# Patient Record
Sex: FEMALE | Race: WHITE | NOT HISPANIC OR LATINO | Employment: OTHER | URBAN - METROPOLITAN AREA
[De-identification: names, ages, dates, MRNs, and addresses within clinical notes are randomized per-mention and may not be internally consistent; named-entity substitution may affect disease eponyms.]

---

## 2017-05-04 ENCOUNTER — HOSPITAL ENCOUNTER (OUTPATIENT)
Dept: FAMILY MEDICINE CLINIC | Facility: CLINIC | Age: 37
Setting detail: SPECIMEN
Discharge: HOME | End: 2017-05-04
Attending: NURSE PRACTITIONER | Admitting: NURSE PRACTITIONER

## 2017-05-04 ENCOUNTER — CONVERSION ENCOUNTER (OUTPATIENT)
Dept: FAMILY MEDICINE CLINIC | Facility: CLINIC | Age: 37
End: 2017-05-04

## 2017-05-04 LAB
ALBUMIN SERPL-MCNC: 3.6 G/DL (ref 3.5–4.8)
ALBUMIN/GLOB SERPL: 1.2 {RATIO} (ref 1–1.7)
ALP SERPL-CCNC: 52 IU/L (ref 32–91)
ALT SERPL-CCNC: 12 IU/L (ref 14–54)
ANION GAP SERPL CALC-SCNC: 12.1 MMOL/L (ref 10–20)
AST SERPL-CCNC: 15 IU/L (ref 15–41)
BASOPHILS # BLD AUTO: 0 10*3/UL (ref 0–0.2)
BASOPHILS NFR BLD AUTO: 1 % (ref 0–2)
BILIRUB SERPL-MCNC: 0.4 MG/DL (ref 0.3–1.2)
BUN SERPL-MCNC: 13 MG/DL (ref 8–20)
BUN/CREAT SERPL: 16.3 (ref 5.4–26.2)
CALCIUM SERPL-MCNC: 8.9 MG/DL (ref 8.9–10.3)
CHLORIDE SERPL-SCNC: 105 MMOL/L (ref 101–111)
CHOLEST SERPL-MCNC: 145 MG/DL
CHOLEST/HDLC SERPL: 3.1 {RATIO}
CONV CO2: 30 MMOL/L (ref 22–32)
CONV LDL CHOLESTEROL DIRECT: 72 MG/DL (ref 0–100)
CONV TOTAL PROTEIN: 6.6 G/DL (ref 6.1–7.9)
CREAT UR-MCNC: 0.8 MG/DL (ref 0.4–1)
DIFFERENTIAL METHOD BLD: (no result)
EOSINOPHIL # BLD AUTO: 0.2 10*3/UL (ref 0–0.3)
EOSINOPHIL # BLD AUTO: 3 % (ref 0–3)
ERYTHROCYTE [DISTWIDTH] IN BLOOD BY AUTOMATED COUNT: 19.3 % (ref 11.5–14.5)
GLOBULIN UR ELPH-MCNC: 3 G/DL (ref 2.5–3.8)
GLUCOSE SERPL-MCNC: 110 MG/DL (ref 65–99)
HCT VFR BLD AUTO: 34 % (ref 35–49)
HDLC SERPL-MCNC: 47 MG/DL
HGB BLD-MCNC: 10.6 G/DL (ref 12–15)
LDLC/HDLC SERPL: 1.5 {RATIO}
LIPID INTERPRETATION: ABNORMAL
LYMPHOCYTES # BLD AUTO: 1.8 10*3/UL (ref 0.8–4.8)
LYMPHOCYTES NFR BLD AUTO: 30 % (ref 18–42)
MCH RBC QN AUTO: 21.8 PG (ref 26–32)
MCHC RBC AUTO-ENTMCNC: 31.1 G/DL (ref 32–36)
MCV RBC AUTO: 70.1 FL (ref 80–94)
MONOCYTES # BLD AUTO: 0.3 10*3/UL (ref 0.1–1.3)
MONOCYTES NFR BLD AUTO: 6 % (ref 2–11)
NEUTROPHILS # BLD AUTO: 3.6 10*3/UL (ref 2.3–8.6)
NEUTROPHILS NFR BLD AUTO: 60 % (ref 50–75)
NRBC BLD AUTO-RTO: 0 /100{WBCS}
NRBC/RBC NFR BLD MANUAL: 0 10*3/UL
PLATELET # BLD AUTO: 181 10*3/UL (ref 150–450)
PMV BLD AUTO: 10.5 FL (ref 7.4–10.4)
POTASSIUM SERPL-SCNC: 4.1 MMOL/L (ref 3.6–5.1)
RBC # BLD AUTO: 4.85 10*6/UL (ref 4–5.4)
SODIUM SERPL-SCNC: 143 MMOL/L (ref 136–144)
TRIGL SERPL-MCNC: 191 MG/DL
TSH SERPL-ACNC: 2.79 UIU/ML (ref 0.34–5.6)
VLDLC SERPL CALC-MCNC: 25.6 MG/DL
WBC # BLD AUTO: 6 10*3/UL (ref 4.5–11.5)

## 2018-02-20 ENCOUNTER — CONVERSION ENCOUNTER (OUTPATIENT)
Dept: FAMILY MEDICINE CLINIC | Facility: CLINIC | Age: 38
End: 2018-02-20

## 2018-02-20 ENCOUNTER — HOSPITAL ENCOUNTER (OUTPATIENT)
Dept: FAMILY MEDICINE CLINIC | Facility: CLINIC | Age: 38
Setting detail: SPECIMEN
Discharge: HOME | End: 2018-02-20
Attending: NURSE PRACTITIONER | Admitting: NURSE PRACTITIONER

## 2018-02-20 LAB
ALBUMIN SERPL-MCNC: 3.7 G/DL (ref 3.5–4.8)
ALBUMIN/GLOB SERPL: 1 {RATIO} (ref 1–1.7)
ALP SERPL-CCNC: 55 IU/L (ref 32–91)
ALT SERPL-CCNC: 12 IU/L (ref 14–54)
ANION GAP SERPL CALC-SCNC: 11.2 MMOL/L (ref 10–20)
APTT BLD: 25.6 SEC (ref 61–76.5)
AST SERPL-CCNC: 18 IU/L (ref 15–41)
BASOPHILS # BLD AUTO: 0.1 10*3/UL (ref 0–0.2)
BASOPHILS NFR BLD AUTO: 1 % (ref 0–2)
BILIRUB SERPL-MCNC: 0.4 MG/DL (ref 0.3–1.2)
BUN SERPL-MCNC: 5 MG/DL (ref 8–20)
BUN/CREAT SERPL: 5.6 (ref 5.4–26.2)
CALCIUM SERPL-MCNC: 8.7 MG/DL (ref 8.9–10.3)
CHLORIDE SERPL-SCNC: 101 MMOL/L (ref 101–111)
CONV CO2: 29 MMOL/L (ref 22–32)
CONV TOTAL PROTEIN: 7.3 G/DL (ref 6.1–7.9)
CREAT UR-MCNC: 0.9 MG/DL (ref 0.4–1)
DIFFERENTIAL METHOD BLD: (no result)
EOSINOPHIL # BLD AUTO: 0.3 10*3/UL (ref 0–0.3)
EOSINOPHIL # BLD AUTO: 4 % (ref 0–3)
ERYTHROCYTE [DISTWIDTH] IN BLOOD BY AUTOMATED COUNT: 19.3 % (ref 11.5–14.5)
GLOBULIN UR ELPH-MCNC: 3.6 G/DL (ref 2.5–3.8)
GLUCOSE SERPL-MCNC: 92 MG/DL (ref 65–99)
HCT VFR BLD AUTO: 31.8 % (ref 35–49)
HGB BLD-MCNC: 9.6 G/DL (ref 12–15)
INR PPP: 1 (ref 2–3)
IRON SATN MFR SERPL: 4 % (ref 15–50)
IRON SERPL-MCNC: 18 UG/DL (ref 28–170)
LYMPHOCYTES # BLD AUTO: 2 10*3/UL (ref 0.8–4.8)
LYMPHOCYTES NFR BLD AUTO: 30 % (ref 18–42)
MCH RBC QN AUTO: 20.2 PG (ref 26–32)
MCHC RBC AUTO-ENTMCNC: 30.1 G/DL (ref 32–36)
MCV RBC AUTO: 66.9 FL (ref 80–94)
MONOCYTES # BLD AUTO: 0.3 10*3/UL (ref 0.1–1.3)
MONOCYTES NFR BLD AUTO: 5 % (ref 2–11)
NEUTROPHILS # BLD AUTO: 4.1 10*3/UL (ref 2.3–8.6)
NEUTROPHILS NFR BLD AUTO: 60 % (ref 50–75)
NRBC BLD AUTO-RTO: 0 /100{WBCS}
NRBC/RBC NFR BLD MANUAL: 0 10*3/UL
PLATELET # BLD AUTO: 209 10*3/UL (ref 150–450)
PMV BLD AUTO: 9.5 FL (ref 7.4–10.4)
POTASSIUM SERPL-SCNC: 4.2 MMOL/L (ref 3.6–5.1)
PROTHROMBIN TIME: 10.4 SEC (ref 19.4–28.5)
RBC # BLD AUTO: 4.76 10*6/UL (ref 4–5.4)
SODIUM SERPL-SCNC: 137 MMOL/L (ref 136–144)
TIBC SERPL-MCNC: 479 UG/DL (ref 228–428)
WBC # BLD AUTO: 6.9 10*3/UL (ref 4.5–11.5)

## 2018-02-21 LAB
HAV IGM SERPL QL IA: NONREACTIVE
HBV CORE IGM SERPL QL IA: NONREACTIVE
HBV SURFACE AG SERPL QL IA: NONREACTIVE
HCV AB SER DONR QL: NORMAL
HCV AB SER DONR QL: NORMAL

## 2019-06-04 VITALS
RESPIRATION RATE: 16 BRPM | HEART RATE: 80 BPM | RESPIRATION RATE: 20 BRPM | WEIGHT: 284 LBS | HEIGHT: 72 IN | WEIGHT: 293 LBS | HEART RATE: 88 BPM | SYSTOLIC BLOOD PRESSURE: 120 MMHG | DIASTOLIC BLOOD PRESSURE: 72 MMHG | DIASTOLIC BLOOD PRESSURE: 79 MMHG | OXYGEN SATURATION: 94 % | SYSTOLIC BLOOD PRESSURE: 133 MMHG | BODY MASS INDEX: 38.47 KG/M2

## 2019-08-12 RX ORDER — DULOXETIN HYDROCHLORIDE 60 MG/1
CAPSULE, DELAYED RELEASE ORAL
Qty: 60 CAPSULE | Refills: 3 | Status: SHIPPED | OUTPATIENT
Start: 2019-08-12

## 2023-04-01 ENCOUNTER — HOSPITAL ENCOUNTER (EMERGENCY)
Facility: HOSPITAL | Age: 43
Discharge: HOME | End: 2023-04-01
Attending: EMERGENCY MEDICINE | Admitting: EMERGENCY MEDICINE
Payer: MEDICARE

## 2023-04-01 VITALS
RESPIRATION RATE: 16 BRPM | SYSTOLIC BLOOD PRESSURE: 130 MMHG | TEMPERATURE: 98.4 F | HEIGHT: 71 IN | WEIGHT: 293 LBS | DIASTOLIC BLOOD PRESSURE: 82 MMHG | HEART RATE: 79 BPM | OXYGEN SATURATION: 100 % | BODY MASS INDEX: 41.02 KG/M2

## 2023-04-01 DIAGNOSIS — N93.9 VAGINAL BLEEDING: Primary | ICD-10-CM

## 2023-04-01 DIAGNOSIS — R53.83 OTHER FATIGUE: ICD-10-CM

## 2023-04-01 LAB
ALBUMIN SERPL-MCNC: 4.2 G/DL (ref 3.5–5.2)
ALBUMIN/GLOB SERPL: 1.6 G/DL
ALP SERPL-CCNC: 59 U/L (ref 39–117)
ALT SERPL W P-5'-P-CCNC: 12 U/L (ref 1–33)
ANION GAP SERPL CALCULATED.3IONS-SCNC: 9 MMOL/L (ref 5–15)
AST SERPL-CCNC: 19 U/L (ref 1–32)
B-HCG UR QL: NEGATIVE
BASOPHILS # BLD AUTO: 0 10*3/MM3 (ref 0–0.2)
BASOPHILS NFR BLD AUTO: 0.8 % (ref 0–1.5)
BILIRUB SERPL-MCNC: <0.2 MG/DL (ref 0–1.2)
BUN SERPL-MCNC: 16 MG/DL (ref 6–20)
BUN/CREAT SERPL: 17.2 (ref 7–25)
CALCIUM SPEC-SCNC: 9.6 MG/DL (ref 8.6–10.5)
CHLORIDE SERPL-SCNC: 105 MMOL/L (ref 98–107)
CO2 SERPL-SCNC: 28 MMOL/L (ref 22–29)
CREAT SERPL-MCNC: 0.93 MG/DL (ref 0.57–1)
DEPRECATED RDW RBC AUTO: 72.6 FL (ref 37–54)
EGFRCR SERPLBLD CKD-EPI 2021: 78.4 ML/MIN/1.73
EOSINOPHIL # BLD AUTO: 0.3 10*3/MM3 (ref 0–0.4)
EOSINOPHIL NFR BLD AUTO: 4.6 % (ref 0.3–6.2)
ERYTHROCYTE [DISTWIDTH] IN BLOOD BY AUTOMATED COUNT: 25.7 % (ref 12.3–15.4)
GLOBULIN UR ELPH-MCNC: 2.6 GM/DL
GLUCOSE SERPL-MCNC: 118 MG/DL (ref 65–99)
HCT VFR BLD AUTO: 37.1 % (ref 34–46.6)
HGB BLD-MCNC: 11.9 G/DL (ref 12–15.9)
HOLD SPECIMEN: NORMAL
LYMPHOCYTES # BLD AUTO: 1.2 10*3/MM3 (ref 0.7–3.1)
LYMPHOCYTES NFR BLD AUTO: 20.9 % (ref 19.6–45.3)
MCH RBC QN AUTO: 26.1 PG (ref 26.6–33)
MCHC RBC AUTO-ENTMCNC: 32.1 G/DL (ref 31.5–35.7)
MCV RBC AUTO: 81.2 FL (ref 79–97)
MONOCYTES # BLD AUTO: 0.3 10*3/MM3 (ref 0.1–0.9)
MONOCYTES NFR BLD AUTO: 4.2 % (ref 5–12)
NEUTROPHILS NFR BLD AUTO: 4.1 10*3/MM3 (ref 1.7–7)
NEUTROPHILS NFR BLD AUTO: 69.5 % (ref 42.7–76)
NRBC BLD AUTO-RTO: 0.1 /100 WBC (ref 0–0.2)
PLATELET # BLD AUTO: 183 10*3/MM3 (ref 140–450)
PMV BLD AUTO: 8.9 FL (ref 6–12)
POTASSIUM SERPL-SCNC: 4.2 MMOL/L (ref 3.5–5.2)
PROT SERPL-MCNC: 6.8 G/DL (ref 6–8.5)
RBC # BLD AUTO: 4.57 10*6/MM3 (ref 3.77–5.28)
SODIUM SERPL-SCNC: 142 MMOL/L (ref 136–145)
WBC NRBC COR # BLD: 5.9 10*3/MM3 (ref 3.4–10.8)

## 2023-04-01 PROCEDURE — 80053 COMPREHEN METABOLIC PANEL: CPT

## 2023-04-01 PROCEDURE — 85025 COMPLETE CBC W/AUTO DIFF WBC: CPT

## 2023-04-01 PROCEDURE — 99284 EMERGENCY DEPT VISIT MOD MDM: CPT

## 2023-04-01 PROCEDURE — 81025 URINE PREGNANCY TEST: CPT

## 2023-04-01 RX ORDER — SODIUM CHLORIDE 0.9 % (FLUSH) 0.9 %
10 SYRINGE (ML) INJECTION AS NEEDED
Status: DISCONTINUED | OUTPATIENT
Start: 2023-04-01 | End: 2023-04-01 | Stop reason: HOSPADM

## 2023-04-01 NOTE — DISCHARGE INSTRUCTIONS
Follow up with GYN Monday     Follow up with PCP as needed     Return to the ED for new or worsening symptoms

## 2023-04-01 NOTE — ED PROVIDER NOTES
Subjective   History of Present Illness  Chief Complaint: Fatigue, concerns for anemia      HPI: Patient is a 42-year-old female presents to the ER today with increasing vaginal bleeding and fatigue she has been having persistent vaginal bleeding for over 1 year states she has not seen her gynecologist in over a year she had been taking Provera 40 mg daily but states that she has been out of it for 3 weeks and has had increasing bleeding.  She states she has been told in the past that she needed an ablation but left the hospital and has not followed up since.  She has had no chest pain or shortness of breath.    She has been sexually active without protection in the last 2 months, is not currently on birth control    PCP: [facility]        Review of Systems   Constitutional: Positive for fatigue.   Respiratory: Negative for shortness of breath.    Cardiovascular: Negative for chest pain.   Gastrointestinal: Negative for abdominal pain, nausea and vomiting.   Genitourinary: Positive for vaginal bleeding. Negative for dysuria.   Neurological: Negative for dizziness and headaches.       No past medical history on file.    Allergies   Allergen Reactions   • Ibuprofen Swelling   • Latex Rash       No past surgical history on file.    No family history on file.    Social History     Socioeconomic History   • Marital status:            Objective   Physical Exam  Vitals reviewed.   Constitutional:       General: She is not in acute distress.     Appearance: She is obese. She is not ill-appearing.   HENT:      Head: Normocephalic.   Eyes:      Extraocular Movements: Extraocular movements intact.      Pupils: Pupils are equal, round, and reactive to light.   Cardiovascular:      Rate and Rhythm: Normal rate.      Pulses: Normal pulses.      Heart sounds: Normal heart sounds.   Pulmonary:      Effort: Pulmonary effort is normal.      Breath sounds: Normal breath sounds. No rales.   Abdominal:      General: Bowel sounds are  "normal.      Palpations: Abdomen is soft.      Tenderness: There is no abdominal tenderness.   Musculoskeletal:         General: Normal range of motion.      Cervical back: Normal range of motion and neck supple.   Skin:     General: Skin is warm and dry.      Capillary Refill: Capillary refill takes less than 2 seconds.      Coloration: Skin is not pale.   Neurological:      General: No focal deficit present.      Mental Status: She is alert and oriented to person, place, and time. Mental status is at baseline.      Motor: No weakness.   Psychiatric:         Mood and Affect: Mood normal.         Behavior: Behavior normal.         Thought Content: Thought content normal.         Judgment: Judgment normal.         Procedures           ED Course  ED Course as of 04/01/23 1846   Sat Apr 01, 2023   1741 Hemoglobin(!): 11.9 []   1826 Awaiting return call from GYN   []   1837 Spoke with Dr. Okeefe states as patient will not consent to pelvic exam or repeat ultrasound, do not refill medroxyprogesterone.   Advise her to follow up with GYN. []      ED Course User Index  [] Shani Greenwood, APRN      /82   Pulse 79   Temp 98.4 °F (36.9 °C)   Resp 16   Ht 180.3 cm (71\")   Wt (!) 147 kg (323 lb 3.1 oz)   LMP 03/11/2023   SpO2 100%   BMI 45.08 kg/m²   Labs Reviewed   COMPREHENSIVE METABOLIC PANEL - Abnormal; Notable for the following components:       Result Value    Glucose 118 (*)     All other components within normal limits    Narrative:     GFR Normal >60  Chronic Kidney Disease <60  Kidney Failure <15     CBC WITH AUTO DIFFERENTIAL - Abnormal; Notable for the following components:    Hemoglobin 11.9 (*)     MCH 26.1 (*)     RDW 25.7 (*)     RDW-SD 72.6 (*)     Monocyte % 4.2 (*)     All other components within normal limits   PREGNANCY, URINE - Normal   CBC AND DIFFERENTIAL    Narrative:     The following orders were created for panel order CBC & Differential.  Procedure                            "    Abnormality         Status                     ---------                               -----------         ------                     CBC Auto Differential[[id number]]        Abnormal            Final result               Scan Slide[[id number]]                                                                    Please view results for these tests on the individual orders.   EXTRA TUBES    Narrative:     The following orders were created for panel order Extra Tubes.  Procedure                               Abnormality         Status                     ---------                               -----------         ------                     Green Top (Gel)[[id number]]                                  Final result                 Please view results for these tests on the individual orders.   GREEN TOP     Medications   sodium chloride 0.9 % flush 10 mL (has no administration in time range)     No radiology results for the last day                                       Medical Decision Making  As patient presented to the emergency room with increased fatigue concerns for anemia, she has a chronic history of vaginal bleeding she has not seen her gynecologist in over a year, stated that she takes methyl progesterone daily but has been without it for 3 weeks, stating that her gynecologist has not refilled it as it has been prolonged since her last visit.  She does have a known history of iron deficiency anemia but states she does not take iron.  I offered evaluation related to her increased bleeding but patient declined stating she did not want a pelvic exam and did not want a ultrasound as she knows this is a chronic issue.  CBC obtained today, no leukocytosis, hemoglobin noted 11.9.  CMP notes no acute kidney injury no hypokalemia or hyponatremia.  At reevaluation patient is ambulating around the exam room in no acute distress with no episodes of hypoxia taking her daily medications including gabapentin, hydroxyzine.     I  spoke with Dr. Okeefe with gynecology, discussed the patient's medical history and that she has not seen her GYN in a year and she has been without her medroxyprogesterone for approaching 3 weeks, and that the patient did not want a pelvic exam or an ultrasound to rule out acute changes as she states that the bleeding is related to her chronic problem.  Advised to not prescribe the medication and advised the patient to follow-up with her primary care next week as she has a stable hemoglobin today at 11.9.  She has been hemodynamically stable in the emergency room.  I discussed this with the patient following a negative pregnancy test, I again offered additional testing including a pelvic exam and ultrasound which patient declined.  Again stating that she knows this is related to her chronic bleeding.    Patient was alert and oriented, she will be discharged  Chart review:    5/20/2022 patient had a ultrasound of the pelvis completed at [facility] findings include the uterus is normal size measuring 9.6 in length and 6.4 x 6.1 transverse.  The myometrium is homogenous.  The endometrial stripe is abnormally thickened at 3 cm the right ovary is enlarged by dominant simple cyst likely physiological in a premenopausal woman.  The left ovary is not visualized on transabdominal or endovaginal screening.  Abnormal thickening of the endometrium in a nonpregnant patient.  Differential diagnosis include endometrial hyperplasia polyp or neoplasm.    Hemoglobin at that time was 6.7 with hematocrit of 24.5, white blood cell count 2.3   ferritin 6.1, iron 10, iron saturation 3, TIBC 334        This document is intended for medical expert use only. Reading of this document by patients and/or patient's family without participating medical staff guidance may result in misinterpretation and unintended morbidity. Any interpretation of such data is the responsibility of the patient and/or family member responsible for the patient in  concert with their primary or specialist providers, not to be left for sources of online searches such as [employer], [employer] or similar queries. Relying on these approaches to knowledge may result in misinterpretation, misguided goals of care and even death should patients or family members try recommendations outside of the realm of professional medical care in a supervised inpatient environment.     Appropriate PPE worn during exam.    Other fatigue: acute illness or injury  Vaginal bleeding: complicated acute illness or injury  Amount and/or Complexity of Data Reviewed  External Data Reviewed: notes.  Labs: ordered. Decision-making details documented in ED Course.      Risk  Prescription drug management.          Final diagnoses:   Vaginal bleeding   Other fatigue       ED Disposition  ED Disposition     ED Disposition   Discharge    Condition   Stable    Comment   --             Keith Drake MD  [address]   [place] [address] [phone]          Doris García MD  [address]  [place] [address] [phone]               Medication List      No changes were made to your prescriptions during this visit.          Shani Greenwood, APRN  04/01/23 0046